# Patient Record
(demographics unavailable — no encounter records)

---

## 2024-11-01 NOTE — DISCUSSION/SUMMARY
[FreeTextEntry1] : Growing along his curve, could maximize nutrition, motivated to make changes in setting of positive body image, provided handout on healthy high calorie foods and protein sources, recommended daily breakfast intake and decreased processed snacks, otherwise doing well in school, normal exam, defers flu vaccine today to next month when he can return for 16 year vaccines.  - Vital signs reviewed and normal - Reviewed with child during private discussion, anticipatory guidance regarding HEADSS assessment topics, and pertinent positive screens as applicable - Routine age appropriate bright futures topics discussed, including but not limited to the topics below - Provided positive reinforcement for healthy lifestyle, including encouraging physical activity, having a well rounded diet, limiting sugary snacks/drinks, and have a routine sleep schedule/getting at least 8 hours of sleep daily - Brush teeth twice daily and see dentist at least once yearly - Continue to work hard in school! Think about future plans and discuss any questions or concerns with your parents or school career advisors - Focus on healthy relationships with your peers and identify those who are a bad influence. Parents should get to know child's friends and encourage healthy relationships, avoid dangerous situations, and promote safety - Review safety with your child as they become more independent, such as knowing their home address and parents phone numbers, and who to call in case of an emergency - Getting a child their own phone is an individual family decision, however consider screen time limits and installing parental controls (you can reach out to your phone provider to learn how to do this) - AAP Bright Futures handout provided today - Follow up for next physical in 1 year

## 2024-11-01 NOTE — ADDENDUM
[FreeTextEntry1] : Addendum 11/1 at 12:08PM: Spoke with FOC, reviewed we did not discuss his high blood pressure at his visit, no prior high blood pressures, FOC unsure if he was anxious about visit/vaccines, no history of headaches/vision changes. FOC does not want to come in sooner, has upcoming nutrition/weight check on 1/10/25. Reviewed can follow up blood pressure at that appointment, but come in sooner if he reports headaches or vision checks as this could be an emergency. FOC expressed understanding of plan.

## 2024-11-01 NOTE — PHYSICAL EXAM
[Alert] : alert [No Acute Distress] : no acute distress [Normocephalic] : normocephalic [EOMI Bilateral] : EOMI bilateral [Clear tympanic membranes with bony landmarks and light reflex present bilaterally] : clear tympanic membranes with bony landmarks and light reflex present bilaterally  [Pink Nasal Mucosa] : pink nasal mucosa [Nonerythematous Oropharynx] : nonerythematous oropharynx [Supple, full passive range of motion] : supple, full passive range of motion [No Palpable Masses] : no palpable masses [Clear to Auscultation Bilaterally] : clear to auscultation bilaterally [Regular Rate and Rhythm] : regular rate and rhythm [Normal S1, S2 audible] : normal S1, S2 audible [No Murmurs] : no murmurs [+2 Femoral Pulses] : +2 femoral pulses [Soft] : soft [NonTender] : non tender [Non Distended] : non distended [Normoactive Bowel Sounds] : normoactive bowel sounds [No Hepatomegaly] : no hepatomegaly [No Splenomegaly] : no splenomegaly [Asud: _____] : Saud [unfilled] [Bilateral descended testes] : bilateral descended testes [No Abnormal Lymph Nodes Palpated] : no abnormal lymph nodes palpated [Normal Muscle Tone] : normal muscle tone [No Gait Asymmetry] : no gait asymmetry [No pain or deformities with palpation of bone, muscles, joints] : no pain or deformities with palpation of bone, muscles, joints [Straight] : straight [+2 Patella DTR] : +2 patella DTR [Cranial Nerves Grossly Intact] : cranial nerves grossly intact [No Rash or Lesions] : no rash or lesions [FreeTextEntry6] : Purpose of genital exam reviewed and chaperone policy discussed. Chaperone during today's visit: Dad

## 2024-11-01 NOTE — RISK ASSESSMENT

## 2024-11-01 NOTE — PHYSICAL EXAM
[Alert] : alert [No Acute Distress] : no acute distress [Normocephalic] : normocephalic [EOMI Bilateral] : EOMI bilateral [Clear tympanic membranes with bony landmarks and light reflex present bilaterally] : clear tympanic membranes with bony landmarks and light reflex present bilaterally  [Pink Nasal Mucosa] : pink nasal mucosa [Nonerythematous Oropharynx] : nonerythematous oropharynx [Supple, full passive range of motion] : supple, full passive range of motion [No Palpable Masses] : no palpable masses [Clear to Auscultation Bilaterally] : clear to auscultation bilaterally [Regular Rate and Rhythm] : regular rate and rhythm [Normal S1, S2 audible] : normal S1, S2 audible [No Murmurs] : no murmurs [+2 Femoral Pulses] : +2 femoral pulses [Soft] : soft [NonTender] : non tender [Non Distended] : non distended [Normoactive Bowel Sounds] : normoactive bowel sounds [No Hepatomegaly] : no hepatomegaly [No Splenomegaly] : no splenomegaly [Saud: _____] : Saud [unfilled] [Bilateral descended testes] : bilateral descended testes [No Abnormal Lymph Nodes Palpated] : no abnormal lymph nodes palpated [Normal Muscle Tone] : normal muscle tone [No Gait Asymmetry] : no gait asymmetry [No pain or deformities with palpation of bone, muscles, joints] : no pain or deformities with palpation of bone, muscles, joints [Straight] : straight [+2 Patella DTR] : +2 patella DTR [Cranial Nerves Grossly Intact] : cranial nerves grossly intact [No Rash or Lesions] : no rash or lesions [FreeTextEntry6] : Purpose of genital exam reviewed and chaperone policy discussed. Chaperone during today's visit: Dad

## 2024-11-01 NOTE — HISTORY OF PRESENT ILLNESS
[FreeTextEntry1] : Here with Dad for annual visit Had ER visit last week for sore throat, strep negative, all symptoms now resolved No subspeciality visits  Eats whatever Mom gives him Skips breakfast on school days Milk once daily, no juice/soda Snacks 2x daily, chips/trail mix Eats whole dinner plate  Rare fast food Motivated to gain weight as just started weight lifting and playing soccer   Elimination: Normal, no constipation   Sleep: Normal pattern   Behavior concerns: No concerns Mood concerns: No concerns   Activity: Minutes active daily 60   School: IEP No Performance Normal Parent/teacher concerns None   Screen time amount <2 hours Screen time monitored Yes Screen time plan discussed Yes   Environment: Smoke exposure No Firearms in the home No Smoke/CO detectors Yes Wears seatbelt Yes   Dental home Yes Last visit Within last 1 year Brushes twice daily Yes Source of fluoride Toothpaste  Adolescent confidentially discussed with patient/parent: Yes   Home: Has positive relationship with parent(s): yes Comfortable asking parents(s)/family for help: yes   Education: Has positive attitude toward school: yes Is involved in school activities: yes Good academic achievement: yes   Activities: Is involved in group activities: yes Is engaged in risky and/or harmful activities: no   Drugs: Using drugs: no Using cannabis: no Using alcohol: no Using tobacco: no   Sexuality: Previously sexually active: no Currently sexually active: no   Suicide/Depression/Self-Image: Has positive self-esteem/self-image: yes Has anxiety: no Has depression: no Has suicidal ideation: no   Safety: Feels safe at home: yes Feels safe at school: yes  CRAFTT normal PHQ2 normal

## 2024-11-01 NOTE — RISK ASSESSMENT

## 2025-01-10 NOTE — DISCUSSION/SUMMARY
[FreeTextEntry1] : Menactra#2 and Fluzone given by Nurse Mari Smith follow up one yr [] : The components of the vaccine(s) to be administered today are listed in the plan of care. The disease(s) for which the vaccine(s) are intended to prevent and the risks have been discussed with the caretaker.  The risks are also included in the appropriate vaccination information statements which have been provided to the patient's caregiver.  The caregiver has given consent to vaccinate.

## 2025-01-10 NOTE — HISTORY OF PRESENT ILLNESS
[Influenza] : Influenza [Meningococcal ACWY] : Meningococcal ACWY [FreeTextEntry1] : flu vaccine and menactra #2 child doing well no concerns

## 2025-05-12 NOTE — DISCUSSION/SUMMARY
[FreeTextEntry1] : Adam is a 16 yr old being seen today for an ED follow up  Was seen for Viral illness, He is improving and now afebrile.  Has dried scabs under nose from blowing his nose. Has mildly impetiginous quality to them. Lip lesion is dry and scabbed.  Start Mupirocin TID externally Avoid disturbing scabs by Gently blowing knows avoiding friction Can also use vaseline to area RTO if worsening or spread, or fevers

## 2025-05-12 NOTE — HISTORY OF PRESENT ILLNESS
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  - Chief Complaint: The patient is a 16y Male complaining of fever and rash. - HPI Objective Statement: 15 y/o male with fever for 2 days and rash for 3 days rash on lip and below nose has had similar rash in the past mild cough otherwise healthy  HIV: HIV Test Questions: - In accordance with [place] State law, we offer every patient who comes to our ED an HIV test. Would you like to be tested today? Previously Declined (within the last year)  HEPATITIS C TEST QUESTIONS: Hepatitis C Test Questions: - In accordance with [place] State Law, we offer every patient a Hepatitis C test. Would you like to be tested today? N/A Patient is under age 18 and does not have a history of high risk behavior or is not high risk for Hep C  PAST MEDICAL/SURGICAL/FAMILY/SOCIAL HISTORY: Tobacco Usage: - Tobacco Usage Unknown if ever smoked  ALLERGIES AND HOME MEDICATIONS: Allergies: Allergies: No Known Allergies:  Home Medications: * Patient Currently Takes Medications as of 12-Jan-2018 03:12 documented in Structured Notes - erythromycin 0.5% ophthalmic ointment: 1 application in each affected eye 2 times a day - clindamycin 150 mg oral capsule: 3 cap(s) orally 3 times a day - Valtrex 500 mg oral tablet: 2 tab(s) orally 2 times a day - erythromycin 0.5% ophthalmic ointment: 1 application in each affected eye 2 times a day - polymyxin B-trimethoprim 10,000 units-1 mg/mL ophthalmic solution: 1 drop(s) in each affected eye 3 times a day - clindamycin 150 mg oral capsule: 1 cap(s) orally every 8 hours x 7 days - cephalexin 250 mg/5 mL oral liquid: 8.6 milliliter(s) orally every 12 hours for 3 days  REVIEW OF SYSTEMS: Review of Systems: - ROS STATEMENT: all other ROS negative except as per HPI  PHYSICAL EXAM: - Physical Examination: one vesicle to right lower lip multiple vesicles below nose - CONSTITUTIONAL: In no apparent distress. - HEENMT: Airway patent, TM normal bilaterally, normal appearing mouth, nose, throat, neck supple with full range of motion, no cervical adenopathy. - CARDIAC: Regular rate and rhythm, Heart sounds S1 S2 present, no murmurs, rubs or gallops - RESPIRATORY: No respiratory distress. No stridor, Lungs sounds clear with good aeration bilaterally. - NEUROLOGICAL: Alert and interactive, no focal deficits  RESULTS: Wet Read: There are no Wet Read(s) to document.  DISPOSITION: Care Plan - Instructions: Principal Discharge DX: Viral syndrome.  Impression: 1.  Principal Discharge Dx Viral syndrome.  Medical Decision Making: - The following orders were submitted: Not Applicable - Clinical Summary (MDM): Summarize the clinical encounter viral syndrome with herpetic skin lesions on nose and lip nothing near eye feeling better today rash started 3 days ago last tylenol at 7 am well appearing dc home - Follow-up Instructions (will be supplied to the patient only if discharged) Viral Illness in Children  Your child was seen in the Emergency Department and diagnosed with a viral infection. Viruses are tiny germs that can get into a person's body and cause illness. A virus is the most common cause of illness and fever among children. There are many different types of viruses, and they cause many types of illness, depending on what part of the body is affected. If the virus settles in the nose, throat, and lungs, it causes cough, congestion, and sometimes headache. If it settles in the stomach and intestinal tract, it may cause vomiting and diarrhea. Sometimes it causes vague symptoms of "feeling bad all over," with fussiness, poor appetite, poor sleeping, and lots of crying. A rash may also appear for the first few days, then fade away. Other symptoms can include earache, sore throat, and swollen glands.  A viral illness usually lasts 3 to 5 days, but sometimes it lasts longer, even up to 1 to 2 weeks. ANTIBIOTICS DONT HELP.  General tips for taking care of a child who has a viral infection: -Have your child rest. -Give your child acetaminophen (Tylenol) and/or ibuprofen (Advil, Motrin) for fever, pain, or fussiness. Read and follow all instructions on the label. -Be careful when giving your child over-the-counter cold or flu medicines and acetaminophen at the same time. Many of these medicines also contain acetaminophen. Read the labels to make sure that you are not giving your child more than the recommended dose. Too much Tylenol can be harmful. -Be careful with cough and cold medicines. Don't give them to children younger than 4 years, because they don't work for children that age and can even be harmful. For children 4 years and older, always follow all the instructions carefully. Make sure you know how much medicine to give and how long to use it. And use the dosing device if one is included. -Attempt to give your child lots of fluids, enough so that the urine is light yellow or clear like water. This is very important if your child is vomiting or has diarrhea. Give your child sips of water or drinks such as Pedialyte. Pedialyte contains a mix of salt, sugar, and minerals. You can buy them at drugstores or grocery stores. Give these drinks as long as your child is throwing up or has diarrhea. Do not use them as the only source of liquids or food for more than 1 to 2 days. -Keep your child home from school, , or other public places while he or she has a fever. Follow up with your pediatrician in 1-2 days to make sure that your child is doing better.  Return to the Emergency Department if: -Your child has symptoms of a viral illness for longer than expected. Ask your [facility] health care provider how long symptoms should last. -Treatment at home is not controlling your child's symptoms or they are getting worse. -Your child has signs of needing more fluids. These signs include sunken eyes with few tears, dry mouth with little or no spit, and little or no urine for 8-12 hours. -Your child who is younger than 2 months has a temperature of 100.4F (38C) or higher if not already evaluated for that. -Your child has trouble breathing. -Your child has a severe headache or has a stiff neck.  Disposition: Disposition: DISCHARGE.  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  - Chief Complaint: The patient is a 16y Male complaining of fever and rash. - HPI Objective Statement: 15 y/o male with fever for 2 days and rash for 3 days rash on lip and below nose has had similar rash in the past mild cough otherwise healthy  HIV: HIV Test Questions: - In accordance with [place] State law, we offer every patient who comes to our ED an HIV test. Would you like to be tested today? Previously Declined (within the last year)  HEPATITIS C TEST QUESTIONS: Hepatitis C Test Questions: - In accordance with [facility] Law, we offer every patient a Hepatitis C test. Would you like to be tested today? N/A Patient is under age 18 and does not have a history of high risk behavior or is not high risk for Hep C  PAST MEDICAL/SURGICAL/FAMILY/SOCIAL HISTORY: Tobacco Usage: - Tobacco Usage Unknown if ever smoked  ALLERGIES AND HOME MEDICATIONS: Allergies: Allergies: No Known Allergies:  Home Medications: * Patient Currently Takes Medications as of 12-Jan-2018 03:12 documented in Structured Notes - erythromycin 0.5% ophthalmic ointment: 1 application in each affected eye 2 times a day - clindamycin 150 mg oral capsule: 3 cap(s) orally 3 times a day - Valtrex 500 mg oral tablet: 2 tab(s) orally 2 times a day - erythromycin 0.5% ophthalmic ointment: 1 application in each affected eye 2 times a day - polymyxin B-trimethoprim 10,000 units-1 mg/mL ophthalmic solution: 1 drop(s) in each affected eye 3 times a day - clindamycin 150 mg oral capsule: 1 cap(s) orally every 8 hours x 7 days - cephalexin 250 mg/5 mL oral liquid: 8.6 milliliter(s) orally every 12 hours for 3 days  REVIEW OF SYSTEMS: Review of Systems: - ROS STATEMENT: all other ROS negative except as per HPI  PHYSICAL EXAM: - Physical Examination: one vesicle to right lower lip multiple vesicles below nose - CONSTITUTIONAL: In no apparent distress. - HEENMT: Airway patent, TM normal bilaterally, normal appearing mouth, nose, throat, neck supple with full range of motion, no cervical adenopathy. - CARDIAC: Regular rate and rhythm, Heart sounds S1 S2 present, no murmurs, rubs or gallops - RESPIRATORY: No respiratory distress. No stridor, Lungs sounds clear with good aeration bilaterally. - NEUROLOGICAL: Alert and interactive, no focal deficits  RESULTS: Wet Read: There are no Wet Read(s) to document.  DISPOSITION: Care Plan - Instructions: Principal Discharge DX: Viral syndrome.  Impression: 1.  Principal Discharge Dx Viral syndrome.  Medical Decision Making: - The following orders were submitted: Not Applicable - Clinical Summary (MDM): Summarize the clinical encounter viral syndrome with herpetic skin lesions on nose and lip nothing near eye feeling better today rash started 3 days ago last tylenol at 7 am well appearing dc home - Follow-up Instructions (will be supplied to the patient only if discharged) Viral Illness in Children  Your child was seen in the Emergency Department and diagnosed with a viral infection. Viruses are tiny germs that can get into a person's body and cause illness. A virus is the most common cause of illness and fever among children. There are many different types of viruses, and they cause many types of illness, depending on what part of the body is affected. If the virus settles in the nose, throat, and lungs, it causes cough, congestion, and sometimes headache. If it settles in the stomach and intestinal tract, it may cause vomiting and diarrhea. Sometimes it causes vague symptoms of "feeling bad all over," with fussiness, poor appetite, poor sleeping, and lots of crying. A rash may also appear for the first few days, then fade away. Other symptoms can include earache, sore throat, and swollen glands.  A viral illness usually lasts 3 to 5 days, but sometimes it lasts longer, even up to 1 to 2 weeks. ANTIBIOTICS DONT HELP.  General tips for taking care of a child who has a viral infection: -Have your child rest. -Give your child acetaminophen (Tylenol) and/or ibuprofen (Advil, Motrin) for fever, pain, or fussiness. Read and follow all instructions on the label. -Be careful when giving your child over-the-counter cold or flu medicines and acetaminophen at the same time. Many of these medicines also contain acetaminophen. Read the labels to make sure that you are not giving your child more than the recommended dose. Too much Tylenol can be harmful. -Be careful with cough and cold medicines. Don't give them to children younger than 4 years, because they don't work for children that age and can even be harmful. For children 4 years and older, always follow all the instructions carefully. Make sure you know how much medicine to give and how long to use it. And use the dosing device if one is included. -Attempt to give your child lots of fluids, enough so that the urine is light yellow or clear like water. This is very important if your child is vomiting or has diarrhea. Give your child sips of water or drinks such as Pedialyte. Pedialyte contains a mix of salt, sugar, and minerals. You can buy them at drugstores or grocery stores. Give these drinks as long as your child is throwing up or has diarrhea. Do not use them as the only source of liquids or food for more than 1 to 2 days. -Keep your child home from school, , or other public places while he or she has a fever. Follow up with your pediatrician in 1-2 days to make sure that your child is doing better.  Return to the Emergency Department if: -Your child has symptoms of a viral illness for longer than expected. Ask your [facility] health care provider how long symptoms should last. -Treatment at home is not controlling your child's symptoms or they are getting worse. -Your child has signs of needing more fluids. These signs include sunken eyes with few tears, dry mouth with little or no spit, and little or no urine for 8-12 hours. -Your child who is younger than 2 months has a temperature of 100.4F (38C) or higher if not already evaluated for that. -Your child has trouble breathing. -Your child has a severe headache or has a stiff neck.  Disposition: Disposition: DISCHARGE.

## 2025-05-12 NOTE — HISTORY OF PRESENT ILLNESS
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  - Chief Complaint: The patient is a 16y Male complaining of fever and rash. - HPI Objective Statement: 15 y/o male with fever for 2 days and rash for 3 days rash on lip and below nose has had similar rash in the past mild cough otherwise healthy  HIV: HIV Test Questions: - In accordance with [place] State law, we offer every patient who comes to our ED an HIV test. Would you like to be tested today? Previously Declined (within the last year)  HEPATITIS C TEST QUESTIONS: Hepatitis C Test Questions: - In accordance with [place] State Law, we offer every patient a Hepatitis C test. Would you like to be tested today? N/A Patient is under age 18 and does not have a history of high risk behavior or is not high risk for Hep C  PAST MEDICAL/SURGICAL/FAMILY/SOCIAL HISTORY: Tobacco Usage: - Tobacco Usage Unknown if ever smoked  ALLERGIES AND HOME MEDICATIONS: Allergies: Allergies: No Known Allergies:  Home Medications: * Patient Currently Takes Medications as of 12-Jan-2018 03:12 documented in Structured Notes - erythromycin 0.5% ophthalmic ointment: 1 application in each affected eye 2 times a day - clindamycin 150 mg oral capsule: 3 cap(s) orally 3 times a day - Valtrex 500 mg oral tablet: 2 tab(s) orally 2 times a day - erythromycin 0.5% ophthalmic ointment: 1 application in each affected eye 2 times a day - polymyxin B-trimethoprim 10,000 units-1 mg/mL ophthalmic solution: 1 drop(s) in each affected eye 3 times a day - clindamycin 150 mg oral capsule: 1 cap(s) orally every 8 hours x 7 days - cephalexin 250 mg/5 mL oral liquid: 8.6 milliliter(s) orally every 12 hours for 3 days  REVIEW OF SYSTEMS: Review of Systems: - ROS STATEMENT: all other ROS negative except as per HPI  PHYSICAL EXAM: - Physical Examination: one vesicle to right lower lip multiple vesicles below nose - CONSTITUTIONAL: In no apparent distress. - HEENMT: Airway patent, TM normal bilaterally, normal appearing mouth, nose, throat, neck supple with full range of motion, no cervical adenopathy. - CARDIAC: Regular rate and rhythm, Heart sounds S1 S2 present, no murmurs, rubs or gallops - RESPIRATORY: No respiratory distress. No stridor, Lungs sounds clear with good aeration bilaterally. - NEUROLOGICAL: Alert and interactive, no focal deficits  RESULTS: Wet Read: There are no Wet Read(s) to document.  DISPOSITION: Care Plan - Instructions: Principal Discharge DX: Viral syndrome.  Impression: 1.  Principal Discharge Dx Viral syndrome.  Medical Decision Making: - The following orders were submitted: Not Applicable - Clinical Summary (MDM): Summarize the clinical encounter viral syndrome with herpetic skin lesions on nose and lip nothing near eye feeling better today rash started 3 days ago last tylenol at 7 am well appearing dc home - Follow-up Instructions (will be supplied to the patient only if discharged) Viral Illness in Children  Your child was seen in the Emergency Department and diagnosed with a viral infection. Viruses are tiny germs that can get into a person's body and cause illness. A virus is the most common cause of illness and fever among children. There are many different types of viruses, and they cause many types of illness, depending on what part of the body is affected. If the virus settles in the nose, throat, and lungs, it causes cough, congestion, and sometimes headache. If it settles in the stomach and intestinal tract, it may cause vomiting and diarrhea. Sometimes it causes vague symptoms of "feeling bad all over," with fussiness, poor appetite, poor sleeping, and lots of crying. A rash may also appear for the first few days, then fade away. Other symptoms can include earache, sore throat, and swollen glands.  A viral illness usually lasts 3 to 5 days, but sometimes it lasts longer, even up to 1 to 2 weeks. ANTIBIOTICS DONT HELP.  General tips for taking care of a child who has a viral infection: -Have your child rest. -Give your child acetaminophen (Tylenol) and/or ibuprofen (Advil, Motrin) for fever, pain, or fussiness. Read and follow all instructions on the label. -Be careful when giving your child over-the-counter cold or flu medicines and acetaminophen at the same time. Many of these medicines also contain acetaminophen. Read the labels to make sure that you are not giving your child more than the recommended dose. Too much Tylenol can be harmful. -Be careful with cough and cold medicines. Don't give them to children younger than 4 years, because they don't work for children that age and can even be harmful. For children 4 years and older, always follow all the instructions carefully. Make sure you know how much medicine to give and how long to use it. And use the dosing device if one is included. -Attempt to give your child lots of fluids, enough so that the urine is light yellow or clear like water. This is very important if your child is vomiting or has diarrhea. Give your child sips of water or drinks such as Pedialyte. Pedialyte contains a mix of salt, sugar, and minerals. You can buy them at drugstores or grocery stores. Give these drinks as long as your child is throwing up or has diarrhea. Do not use them as the only source of liquids or food for more than 1 to 2 days. -Keep your child home from school, , or other public places while he or she has a fever. Follow up with your pediatrician in 1-2 days to make sure that your child is doing better.  Return to the Emergency Department if: -Your child has symptoms of a viral illness for longer than expected. Ask your [facility] health care provider how long symptoms should last. -Treatment at home is not controlling your child's symptoms or they are getting worse. -Your child has signs of needing more fluids. These signs include sunken eyes with few tears, dry mouth with little or no spit, and little or no urine for 8-12 hours. -Your child who is younger than 2 months has a temperature of 100.4F (38C) or higher if not already evaluated for that. -Your child has trouble breathing. -Your child has a severe headache or has a stiff neck.  Disposition: Disposition: DISCHARGE.  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  - Chief Complaint: The patient is a 16y Male complaining of fever and rash. - HPI Objective Statement: 15 y/o male with fever for 2 days and rash for 3 days rash on lip and below nose has had similar rash in the past mild cough otherwise healthy  HIV: HIV Test Questions: - In accordance with [place] law, we offer every patient who comes to our ED an HIV test. Would you like to be tested today? Previously Declined (within the last year)  HEPATITIS C TEST QUESTIONS: Hepatitis C Test Questions: - In accordance with [facility] Law, we offer every patient a Hepatitis C test. Would you like to be tested today? N/A Patient is under age 18 and does not have a history of high risk behavior or is not high risk for Hep C  PAST MEDICAL/SURGICAL/FAMILY/SOCIAL HISTORY: Tobacco Usage: - Tobacco Usage Unknown if ever smoked  ALLERGIES AND HOME MEDICATIONS: Allergies: Allergies: No Known Allergies:  Home Medications: * Patient Currently Takes Medications as of 12-Jan-2018 03:12 documented in Structured Notes - erythromycin 0.5% ophthalmic ointment: 1 application in each affected eye 2 times a day - clindamycin 150 mg oral capsule: 3 cap(s) orally 3 times a day - Valtrex 500 mg oral tablet: 2 tab(s) orally 2 times a day - erythromycin 0.5% ophthalmic ointment: 1 application in each affected eye 2 times a day - polymyxin B-trimethoprim 10,000 units-1 mg/mL ophthalmic solution: 1 drop(s) in each affected eye 3 times a day - clindamycin 150 mg oral capsule: 1 cap(s) orally every 8 hours x 7 days - cephalexin 250 mg/5 mL oral liquid: 8.6 milliliter(s) orally every 12 hours for 3 days  REVIEW OF SYSTEMS: Review of Systems: - ROS STATEMENT: all other ROS negative except as per HPI  PHYSICAL EXAM: - Physical Examination: one vesicle to right lower lip multiple vesicles below nose - CONSTITUTIONAL: In no apparent distress. - HEENMT: Airway patent, TM normal bilaterally, normal appearing mouth, nose, throat, neck supple with full range of motion, no cervical adenopathy. - CARDIAC: Regular rate and rhythm, Heart sounds S1 S2 present, no murmurs, rubs or gallops - RESPIRATORY: No respiratory distress. No stridor, Lungs sounds clear with good aeration bilaterally. - NEUROLOGICAL: Alert and interactive, no focal deficits  RESULTS: Wet Read: There are no Wet Read(s) to document.  DISPOSITION: Care Plan - Instructions: Principal Discharge DX: Viral syndrome.  Impression: 1.  Principal Discharge Dx Viral syndrome.  Medical Decision Making: - The following orders were submitted: Not Applicable - Clinical Summary (MDM): Summarize the clinical encounter viral syndrome with herpetic skin lesions on nose and lip nothing near eye feeling better today rash started 3 days ago last tylenol at 7 am well appearing dc home - Follow-up Instructions (will be supplied to the patient only if discharged) Viral Illness in Children  Your child was seen in the Emergency Department and diagnosed with a viral infection. Viruses are tiny germs that can get into a person's body and cause illness. A virus is the most common cause of illness and fever among children. There are many different types of viruses, and they cause many types of illness, depending on what part of the body is affected. If the virus settles in the nose, throat, and lungs, it causes cough, congestion, and sometimes headache. If it settles in the stomach and intestinal tract, it may cause vomiting and diarrhea. Sometimes it causes vague symptoms of "feeling bad all over," with fussiness, poor appetite, poor sleeping, and lots of crying. A rash may also appear for the first few days, then fade away. Other symptoms can include earache, sore throat, and swollen glands.  A viral illness usually lasts 3 to 5 days, but sometimes it lasts longer, even up to 1 to 2 weeks. ANTIBIOTICS DONT HELP.  General tips for taking care of a child who has a viral infection: -Have your child rest. -Give your child acetaminophen (Tylenol) and/or ibuprofen (Advil, Motrin) for fever, pain, or fussiness. Read and follow all instructions on the label. -Be careful when giving your child over-the-counter cold or flu medicines and acetaminophen at the same time. Many of these medicines also contain acetaminophen. Read the labels to make sure that you are not giving your child more than the recommended dose. Too much Tylenol can be harmful. -Be careful with cough and cold medicines. Don't give them to children younger than 4 years, because they don't work for children that age and can even be harmful. For children 4 years and older, always follow all the instructions carefully. Make sure you know how much medicine to give and how long to use it. And use the dosing device if one is included. -Attempt to give your child lots of fluids, enough so that the urine is light yellow or clear like water. This is very important if your child is vomiting or has diarrhea. Give your child sips of water or drinks such as Pedialyte. Pedialyte contains a mix of salt, sugar, and minerals. You can buy them at drugstores or grocery stores. Give these drinks as long as your child is throwing up or has diarrhea. Do not use them as the only source of liquids or food for more than 1 to 2 days. -Keep your child home from school, , or other public places while he or she has a fever. Follow up with your pediatrician in 1-2 days to make sure that your child is doing better.  Return to the Emergency Department if: -Your child has symptoms of a viral illness for longer than expected. Ask your [facility] health care provider how long symptoms should last. -Treatment at home is not controlling your child's symptoms or they are getting worse. -Your child has signs of needing more fluids. These signs include sunken eyes with few tears, dry mouth with little or no spit, and little or no urine for 8-12 hours. -Your child who is younger than 2 months has a temperature of 100.4F (38C) or higher if not already evaluated for that. -Your child has trouble breathing. -Your child has a severe headache or has a stiff neck.  Disposition: Disposition: DISCHARGE.

## 2025-05-12 NOTE — PHYSICAL EXAM
[NL] : regular rate and rhythm, normal S1, S2 audible, no murmurs [FreeTextEntry1] : well appearing [de-identified] :  scabs under nose around nares, dry lesion on upper left corner of  upper lip

## 2025-05-12 NOTE — PHYSICAL EXAM
[NL] : regular rate and rhythm, normal S1, S2 audible, no murmurs [FreeTextEntry1] : well appearing [de-identified] :  scabs under nose around nares, dry lesion on upper left corner of  upper lip